# Patient Record
Sex: FEMALE | Race: WHITE | HISPANIC OR LATINO
[De-identification: names, ages, dates, MRNs, and addresses within clinical notes are randomized per-mention and may not be internally consistent; named-entity substitution may affect disease eponyms.]

---

## 2020-10-16 ENCOUNTER — RESULT REVIEW (OUTPATIENT)
Age: 45
End: 2020-10-16

## 2020-10-21 ENCOUNTER — APPOINTMENT (OUTPATIENT)
Dept: THORACIC SURGERY | Facility: HOSPITAL | Age: 45
End: 2020-10-21

## 2020-10-21 ENCOUNTER — RESULT REVIEW (OUTPATIENT)
Age: 45
End: 2020-10-21

## 2020-11-02 ENCOUNTER — NON-APPOINTMENT (OUTPATIENT)
Age: 45
End: 2020-11-02

## 2020-11-11 DIAGNOSIS — J18.9 PNEUMONIA, UNSPECIFIED ORGANISM: ICD-10-CM

## 2020-11-11 DIAGNOSIS — Z86.69 PERSONAL HISTORY OF OTHER DISEASES OF THE NERVOUS SYSTEM AND SENSE ORGANS: ICD-10-CM

## 2020-11-11 DIAGNOSIS — N17.9 ACUTE KIDNEY FAILURE, UNSPECIFIED: ICD-10-CM

## 2020-11-11 DIAGNOSIS — J90 PLEURAL EFFUSION, NOT ELSEWHERE CLASSIFIED: ICD-10-CM

## 2020-11-11 DIAGNOSIS — F14.90 COCAINE USE, UNSPECIFIED, UNCOMPLICATED: ICD-10-CM

## 2020-11-11 NOTE — HISTORY OF PRESENT ILLNESS
[FreeTextEntry1] : Mrs. Mann is a 44 year old female here today for initial evaluation of right loculated pleural effusion. She has a past medical history significant for Right Loculated Pleural Effusion. She was admitted to the hospital on ___ to with locuated right pleural effusion s.p IR 8.5Fr palced on 10/16/2020. Drained 1L in total since of clear yellow fluid. Cultures NGTD. Given Meropenem due to KOBY during hospitalization. \par \par Final pathology revealed\par 10/21/2020 \par negative for malignant cells\par reactive mesothelial cells, chronic inflammatory cells favor benign process

## 2020-11-12 ENCOUNTER — APPOINTMENT (OUTPATIENT)
Dept: THORACIC SURGERY | Facility: CLINIC | Age: 45
End: 2020-11-12
Payer: MEDICAID

## 2020-11-12 VITALS
OXYGEN SATURATION: 96 % | HEIGHT: 63 IN | HEART RATE: 62 BPM | SYSTOLIC BLOOD PRESSURE: 126 MMHG | BODY MASS INDEX: 21.79 KG/M2 | DIASTOLIC BLOOD PRESSURE: 78 MMHG | TEMPERATURE: 99.6 F | WEIGHT: 123 LBS | RESPIRATION RATE: 15 BRPM

## 2020-11-12 DIAGNOSIS — B95.61 BACTEREMIA: ICD-10-CM

## 2020-11-12 DIAGNOSIS — J90 PLEURAL EFFUSION, NOT ELSEWHERE CLASSIFIED: ICD-10-CM

## 2020-11-12 DIAGNOSIS — R78.81 BACTEREMIA: ICD-10-CM

## 2020-11-12 DIAGNOSIS — J86.9 PYOTHORAX W/OUT FISTULA: ICD-10-CM

## 2020-11-12 PROCEDURE — 99024 POSTOP FOLLOW-UP VISIT: CPT

## 2020-11-12 RX ORDER — AMLODIPINE BESYLATE 5 MG/1
5 TABLET ORAL
Refills: 0 | Status: ACTIVE | COMMUNITY

## 2020-11-16 PROBLEM — J86.9 EMPYEMA, RIGHT: Status: ACTIVE | Noted: 2020-11-12

## 2022-06-07 PROBLEM — Z00.00 ENCOUNTER FOR PREVENTIVE HEALTH EXAMINATION: Status: ACTIVE | Noted: 2022-06-07

## 2022-07-11 ENCOUNTER — APPOINTMENT (OUTPATIENT)
Dept: OBGYN | Facility: CLINIC | Age: 47
End: 2022-07-11

## 2022-07-25 ENCOUNTER — APPOINTMENT (OUTPATIENT)
Dept: OBGYN | Facility: CLINIC | Age: 47
End: 2022-07-25

## 2022-10-07 ENCOUNTER — NON-APPOINTMENT (OUTPATIENT)
Age: 47
End: 2022-10-07

## 2022-10-10 ENCOUNTER — APPOINTMENT (OUTPATIENT)
Dept: OBGYN | Facility: CLINIC | Age: 47
End: 2022-10-10

## 2022-10-10 ENCOUNTER — LABORATORY RESULT (OUTPATIENT)
Age: 47
End: 2022-10-10

## 2022-10-10 VITALS
BODY MASS INDEX: 25.76 KG/M2 | SYSTOLIC BLOOD PRESSURE: 120 MMHG | DIASTOLIC BLOOD PRESSURE: 76 MMHG | HEIGHT: 62 IN | WEIGHT: 140 LBS

## 2022-10-10 DIAGNOSIS — Z01.419 ENCOUNTER FOR GYNECOLOGICAL EXAMINATION (GENERAL) (ROUTINE) W/OUT ABNORMAL FINDINGS: ICD-10-CM

## 2022-10-10 DIAGNOSIS — Z00.00 ENCOUNTER FOR GENERAL ADULT MEDICAL EXAMINATION W/OUT ABNORMAL FINDINGS: ICD-10-CM

## 2022-10-10 LAB
BILIRUB UR QL STRIP: NORMAL
CLARITY UR: CLEAR
COLLECTION METHOD: NORMAL
GLUCOSE UR-MCNC: NORMAL
HCG UR QL: 0.2 EU/DL
HCG UR QL: NEGATIVE
HGB UR QL STRIP.AUTO: NORMAL
KETONES UR-MCNC: NORMAL
LEUKOCYTE ESTERASE UR QL STRIP: ABNORMAL
NITRITE UR QL STRIP: NORMAL
PH UR STRIP: 7
PROT UR STRIP-MCNC: NORMAL
QUALITY CONTROL: YES
SP GR UR STRIP: 1.01

## 2022-10-10 PROCEDURE — 81025 URINE PREGNANCY TEST: CPT

## 2022-10-10 PROCEDURE — 81003 URINALYSIS AUTO W/O SCOPE: CPT | Mod: QW

## 2022-10-10 PROCEDURE — 99204 OFFICE O/P NEW MOD 45 MIN: CPT | Mod: 25

## 2022-10-10 PROCEDURE — 99386 PREV VISIT NEW AGE 40-64: CPT

## 2022-10-10 NOTE — HISTORY OF PRESENT ILLNESS
[Menarche Age: ____] : age at menarche was [unfilled] [FreeTextEntry1] : Shellie is a 46-year-old coming in for anemia. \par LMP last month. reports regular menses, bleeding for 5 days, 2 heavy days, reports 6 soaked overnight pads during heavy days. No history of pelvic US. \par No history of GYN exam.\par FMP age 11\par Last PAP - never done. \par Sexually not active.\par No history of STDs.\par , 4 NSVDX4\par Mammogram - never done.\par PMH - none\par PSH - none\par Medications - ferrous sulfate, iron infusion.\par Allergies -NKDA\par No smoking or drug use, ETOH - none\par Family -none\par

## 2022-10-10 NOTE — DISCUSSION/SUMMARY
[FreeTextEntry1] : Exam as above.  Some of the history was obtained independently from the chaperone, her sister.\par Suspected subserosal fibroid on the left.\par Pap done.  Mammogram referral given.\par Discussed with the patient definition of abnormal uterine bleeding: abnormal quantity, duration, or schedule of bleeding.\par Discussed etiologies for AUB:\par 1. Structural uterine pathologies such as fibroids, polyps, adenomyosis vascular malformations, other uterine malformations, or neoplasia.\par 2. Non uterine causes such as ovulatory dysfunction, bleeding disorders, medications, cervical disorders.\par Discussed evaluation including:\par 1. Pregnancy test negative\par 2. CBC, ferritin, TIBC to evaluate for anemia\par 3. Thyroid evaluation\par 4. Pelvic US to evaluate for fibroids, adenomyosis, polyps, uterine AV malformations, with possible Saline hysterography if needed\par 5. Endometrial sampling after sonogram evaluation - EMB or hysteroscopic EMC\par 6. Possible evaluation for bleeding disorders\par \par Referrals for blood work and pelvic transvaginal US given. Patient will follow up for results and discussion on next steps\par

## 2022-10-10 NOTE — PHYSICAL EXAM
[Chaperone Present] : A chaperone was present in the examining room during all aspects of the physical examination [Appropriately responsive] : appropriately responsive [Alert] : alert [No Acute Distress] : no acute distress [Soft] : soft [Non-tender] : non-tender [Non-distended] : non-distended [Oriented x3] : oriented x3 [Examination Of The Breasts] : a normal appearance [No Masses] : no breast masses were palpable [Labia Majora] : normal [Labia Minora] : normal [Normal] : normal [Uterine Adnexae] : normal [Enlarged ___ wks] : enlarged [unfilled] ~Uweeks [FreeTextEntry1] : Rebecca hernanedz [FreeTextEntry6] : On the left pelvis there is a mass extending from the uterus, feels like a subserosal fibroid.

## 2022-10-11 LAB
APPEARANCE: CLEAR
BACTERIA: NEGATIVE
BILIRUBIN URINE: NEGATIVE
BLOOD URINE: NEGATIVE
COLOR: YELLOW
GLUCOSE QUALITATIVE U: NEGATIVE
HYALINE CASTS: 1 /LPF
KETONES URINE: NEGATIVE
LEUKOCYTE ESTERASE URINE: NEGATIVE
MICROSCOPIC-UA: NORMAL
NITRITE URINE: NEGATIVE
PH URINE: 6.5
PROTEIN URINE: NORMAL
RED BLOOD CELLS URINE: 1 /HPF
SPECIFIC GRAVITY URINE: 1.02
SQUAMOUS EPITHELIAL CELLS: 2 /HPF
UROBILINOGEN URINE: NORMAL
WHITE BLOOD CELLS URINE: 3 /HPF

## 2022-10-12 LAB — BACTERIA UR CULT: NORMAL

## 2022-10-18 ENCOUNTER — NON-APPOINTMENT (OUTPATIENT)
Age: 47
End: 2022-10-18

## 2022-10-18 LAB
CYTOLOGY CVX/VAG DOC THIN PREP: ABNORMAL
HPV HIGH+LOW RISK DNA PNL CVX: DETECTED

## 2022-10-24 ENCOUNTER — APPOINTMENT (OUTPATIENT)
Dept: OBGYN | Facility: CLINIC | Age: 47
End: 2022-10-24

## 2022-10-24 DIAGNOSIS — R87.610 ATYPICAL SQUAMOUS CELLS OF UNDETERMINED SIGNIFICANCE ON CYTOLOGIC SMEAR OF CERVIX (ASC-US): ICD-10-CM

## 2022-10-24 DIAGNOSIS — R87.810 ATYPICAL SQUAMOUS CELLS OF UNDETERMINED SIGNIFICANCE ON CYTOLOGIC SMEAR OF CERVIX (ASC-US): ICD-10-CM

## 2022-10-24 LAB
HCG UR QL: NEGATIVE
QUALITY CONTROL: YES

## 2022-10-24 PROCEDURE — 81025 URINE PREGNANCY TEST: CPT

## 2022-10-24 PROCEDURE — 57454 BX/CURETT OF CERVIX W/SCOPE: CPT

## 2022-10-24 PROCEDURE — 99212 OFFICE O/P EST SF 10 MIN: CPT | Mod: 25

## 2022-10-24 NOTE — REASON FOR VISIT
[Follow-Up] : a follow-up evaluation of [Family Member] : family member [[employer] ] : provided by [employer]   [Interpreters_IDNumber] : [id number]

## 2022-10-24 NOTE — PROCEDURE
[Colposcopy] : Colposcopy  [Time out performed] : Pre-procedure time out performed.  Patient's name, date of birth and procedure confirmed. [Consent Obtained] : Consent obtained [Risks] : risks [Benefits] : benefits [Alternatives] : alternatives [Patient] : patient [Infection] : infection [Bleeding] : bleeding [Allergic Reaction] : allergic reaction [ASCUS] : ASCUS [HPV High Risk] : HPV high risk [Colposcopy Adequate] : colposcopy adequate [SCI Fully Visualized] : SCI not fully visualized [ECC Performed] : ECC performed [No Abnormalities] : no abnormalities [Lesion] : lesion seen [Biopsy] : biopsy taken [Hemostasis Obtained] : Hemostasis obtained [Tolerated Well] : the patient tolerated the procedure well [de-identified] : 4 [de-identified] : Circumferentially increased vascularity, most prominent at 7, 10, 12 and 4. [de-identified] : 4, 7, 10, 12 [de-identified] : Uneventful colposcopy

## 2022-10-24 NOTE — DISCUSSION/SUMMARY
[FreeTextEntry1] : Reviewed the results of the Pap smear with the patient including low-grade changes with HPV positive, not subtype 16 and 18.  I reviewed that the need for a colposcopy to assess for higher grade changes.  Discussed that if the changes are low-grade, the recommendation is to follow-up in 1 year.  Discussed that if the changes are high-grade, the recommendation is to proceed with an excisional procedure.\par Uneventful colposcopy performed after patient gave consent.\par Follow-up with results.

## 2022-10-24 NOTE — PHYSICAL EXAM
[Chaperone Present] : A chaperone was present in the examining room during all aspects of the physical examination [FreeTextEntry1] : Je toner [Appropriately responsive] : appropriately responsive [Alert] : alert [No Acute Distress] : no acute distress [Soft] : soft [Non-tender] : non-tender [Non-distended] : non-distended [Oriented x3] : oriented x3

## 2022-10-24 NOTE — HISTORY OF PRESENT ILLNESS
[FreeTextEntry1] : The patient came in for colposcopy for abnormal Pap smear, ASCUS, HPV positive, negative for subtype 16/18.\par

## 2022-10-27 ENCOUNTER — NON-APPOINTMENT (OUTPATIENT)
Age: 47
End: 2022-10-27

## 2022-10-28 LAB — CORE LAB BIOPSY: NORMAL

## 2022-11-21 ENCOUNTER — APPOINTMENT (OUTPATIENT)
Dept: OBGYN | Facility: CLINIC | Age: 47
End: 2022-11-21

## 2022-11-21 ENCOUNTER — NON-APPOINTMENT (OUTPATIENT)
Age: 47
End: 2022-11-21

## 2022-11-29 ENCOUNTER — APPOINTMENT (OUTPATIENT)
Dept: OBGYN | Facility: CLINIC | Age: 47
End: 2022-11-29

## 2022-11-29 VITALS
DIASTOLIC BLOOD PRESSURE: 76 MMHG | SYSTOLIC BLOOD PRESSURE: 122 MMHG | WEIGHT: 140 LBS | BODY MASS INDEX: 25.76 KG/M2 | HEIGHT: 62 IN

## 2022-11-29 PROCEDURE — 99214 OFFICE O/P EST MOD 30 MIN: CPT

## 2022-11-29 NOTE — HISTORY OF PRESENT ILLNESS
[FreeTextEntry1] : Shellie came in to review ultrasound results and discuss management options.\par She had an ultrasound done for abnormal uterine bleeding.  The ultrasound shows a 10.4 x 7.5 x 5.8 cm uterus, endometrium 1.6 cm with trace fluid, normal bilateral ovaries.\par The patient came with her sister and declined the use of  phone.  She prefers to use her sister as her .

## 2022-11-29 NOTE — REASON FOR VISIT
[Follow-Up] : a follow-up evaluation of [Abnormal Uterine Bleeding] : abnormal uterine bleeding [Family Member] : family member [FreeTextEntry2] : US results

## 2022-11-29 NOTE — DISCUSSION/SUMMARY
[FreeTextEntry1] : I reviewed the images of the ultrasound myself.\par The patient has areas of significantly thickened irregular endometrium, specifically in the lower uterine segment on transverse image where it appears to be up to 3 cm and in the uterine body weight appears to be up to 2.1 cm.  There is also trace free fluid in the endometrial canal.\par I reviewed these results with the patient and her sister.  I discussed with them that given this findings I would recommend hysteroscopy with endometrial biopsy.  I discussed with them that this could be done both in the office or in the operating room.  Discussed that office hysteroscopy is easier to do with less risk, but if large polyps are found or significant abnormalities are seen, she may have to do a hysteroscopy D&C in the operating room.\par The patient opted for office hysteroscopy with EMB.\par She will be scheduled for the procedure.

## 2022-12-20 ENCOUNTER — NON-APPOINTMENT (OUTPATIENT)
Age: 47
End: 2022-12-20

## 2022-12-20 ENCOUNTER — APPOINTMENT (OUTPATIENT)
Dept: OBGYN | Facility: CLINIC | Age: 47
End: 2022-12-20

## 2023-01-26 ENCOUNTER — APPOINTMENT (OUTPATIENT)
Dept: OBGYN | Facility: CLINIC | Age: 48
End: 2023-01-26
Payer: MEDICAID

## 2023-01-26 VITALS
WEIGHT: 140 LBS | BODY MASS INDEX: 25.76 KG/M2 | HEIGHT: 62 IN | SYSTOLIC BLOOD PRESSURE: 150 MMHG | DIASTOLIC BLOOD PRESSURE: 80 MMHG

## 2023-01-26 DIAGNOSIS — R93.89 ABNORMAL FINDINGS ON DIAGNOSTIC IMAGING OF OTHER SPECIFIED BODY STRUCTURES: ICD-10-CM

## 2023-01-26 DIAGNOSIS — Z00.00 ENCOUNTER FOR GENERAL ADULT MEDICAL EXAMINATION W/OUT ABNORMAL FINDINGS: ICD-10-CM

## 2023-01-26 LAB
HCG UR QL: NEGATIVE
QUALITY CONTROL: YES

## 2023-01-26 PROCEDURE — 81025 URINE PREGNANCY TEST: CPT

## 2023-01-26 PROCEDURE — 99212 OFFICE O/P EST SF 10 MIN: CPT | Mod: 25

## 2023-01-26 PROCEDURE — 58558Z: CUSTOM

## 2023-01-26 NOTE — PHYSICAL EXAM
[Chaperone Present] : A chaperone was present in the examining room during all aspects of the physical examination [FreeTextEntry1] : Je toner [Appropriately responsive] : appropriately responsive [Alert] : alert [No Acute Distress] : no acute distress [Soft] : soft [Non-tender] : non-tender [Non-distended] : non-distended [Oriented x3] : oriented x3 [Labia Majora] : normal [Labia Minora] : normal [Normal] : normal

## 2023-01-26 NOTE — PROCEDURE
[Hysteroscopy] : Hysteroscopy [Abnormal uterine bleeding] : abnormal uterine bleeding [Time out performed] : Pre-procedure time out performed.  Patient's name, date of birth and procedure confirmed. [Consent Obtained] : Consent obtained [Risks] : risks [Benefits] : benefits [Alternatives] : alternatives [Patient] : patient [Infection] : infection [Bleeding] : bleeding [Allergic Reaction] : allergic reaction [rigid] : Using aseptic technique a hysteroscopy was performed using a rigid hysteroscope [Sent to Pathology] : specimen was placed in buffered formalin and sent for pathology [Antibiotics given] : antibiotics not given [Hemostasis obtained] : hemostasis obtained [Tolerated Well] : Patient tolerated the procedure well [Aftercare instructions/regstrictions given and follow-up scheduled] : Aftercare instructions/restrictions given and follow-up scheduled [de-identified] : Hysteroscopy was performed, uterus is very anteverted.  Cervix is long.\par Endometrium appeared to be of polypoid nature, but no distinct polyp was noted.  Somewhat difficult visualization due to angle of the uterus.  EMB done

## 2023-01-26 NOTE — DISCUSSION/SUMMARY
[FreeTextEntry1] : I reviewed with the patient the procedure in detail and a hysteroscopy was EMB was done, see above.  The patient tolerated the procedure well.  I discussed with her follow-up in 2 weeks to review results and discuss management options as needed.  She reports her last period was normal.\par Follow-up in 2 weeks

## 2023-01-26 NOTE — HISTORY OF PRESENT ILLNESS
[FreeTextEntry1] : Shellie came in for hysteroscopy with endometrial biopsy in the office.\par History of AUB, endometrium with areas that looked thickened and irregular.\par   phone was used with consent obtaining,  number is on the consent form.\par

## 2023-01-26 NOTE — REASON FOR VISIT
[Follow-Up] : a follow-up evaluation of [Abnormal Uterine Bleeding] : abnormal uterine bleeding [Family Member] : family member

## 2023-02-09 ENCOUNTER — APPOINTMENT (OUTPATIENT)
Dept: OBGYN | Facility: CLINIC | Age: 48
End: 2023-02-09
Payer: MEDICAID

## 2023-02-09 ENCOUNTER — NON-APPOINTMENT (OUTPATIENT)
Age: 48
End: 2023-02-09

## 2023-02-09 ENCOUNTER — APPOINTMENT (OUTPATIENT)
Dept: OBGYN | Facility: CLINIC | Age: 48
End: 2023-02-09

## 2023-02-09 VITALS — SYSTOLIC BLOOD PRESSURE: 126 MMHG | DIASTOLIC BLOOD PRESSURE: 72 MMHG

## 2023-02-09 DIAGNOSIS — Z71.2 PERSON CONSULTING FOR EXPLANATION OF EXAMINATION OR TEST FINDINGS: ICD-10-CM

## 2023-02-09 PROCEDURE — 99214 OFFICE O/P EST MOD 30 MIN: CPT

## 2023-02-09 NOTE — HISTORY OF PRESENT ILLNESS
[FreeTextEntry1] : Shellie came in to review her biopsy results and discuss management options.\par The biopsy results were benign, proliferative endometrium.\par Shellie has abnormal uterine bleeding that is complicated by anemia.  Per patient she is followed by [facility].

## 2023-02-09 NOTE — DISCUSSION/SUMMARY
[FreeTextEntry1] : I reviewed with Shellie and her sister the results of the biopsy.  Discussed that these findings are benign.  There is does not have fibroids and her uterus appears normal though slightly enlarged on ultrasound.  She did not have a distinct polyp in the uterus on hysteroscopy in the endometrial biopsy was of proliferative endometrium.  Given her reported anemia, I reviewed management options for abnormal uterine bleeding with the patient:\par 1.  Medical management including oral progesterone Depo-Provera injection or progesterone IUD.  I reviewed each option with the patient.\par 2.  Endometrial ablation.  Discussed the procedure of endometrial ablation with the patient.\par 3.  Hysterectomy.\par The patient is interested in medical management trial, and opted for Mirena IUD insertion.  I discussed with her taking Motrin 800 mg 1 hour before her appointment time for IUD insertion.  We discussed that if this management option fails, ablation and hysterectomy are always available.\par I requested that she sign on release of information consent to obtain her medical records from [facility].\par All her questions were answered.\par Follow-up for IUD insertion.

## 2023-02-09 NOTE — REASON FOR VISIT
[Follow-Up] : a follow-up evaluation of [[employer] ] : provided by [employer]   [Family Member] : family member [Interpreters_IDNumber] : [id number] [FreeTextEntry3] : The conversation was disconnected and the patient requested that her sister acts as  and not to call the  service back [TWNoteComboBox1] : Bolivian

## 2023-02-10 LAB — CORE LAB BIOPSY: NORMAL

## 2023-02-14 ENCOUNTER — APPOINTMENT (OUTPATIENT)
Dept: OBGYN | Facility: CLINIC | Age: 48
End: 2023-02-14

## 2023-02-14 ENCOUNTER — NON-APPOINTMENT (OUTPATIENT)
Age: 48
End: 2023-02-14

## 2023-03-14 ENCOUNTER — APPOINTMENT (OUTPATIENT)
Dept: OBGYN | Facility: CLINIC | Age: 48
End: 2023-03-14

## 2023-03-15 ENCOUNTER — APPOINTMENT (OUTPATIENT)
Dept: OBGYN | Facility: CLINIC | Age: 48
End: 2023-03-15
Payer: MEDICAID

## 2023-03-15 VITALS
DIASTOLIC BLOOD PRESSURE: 74 MMHG | HEIGHT: 62 IN | SYSTOLIC BLOOD PRESSURE: 124 MMHG | BODY MASS INDEX: 25.76 KG/M2 | WEIGHT: 140 LBS

## 2023-03-15 DIAGNOSIS — N93.9 ABNORMAL UTERINE AND VAGINAL BLEEDING, UNSPECIFIED: ICD-10-CM

## 2023-03-15 DIAGNOSIS — D64.9 ANEMIA, UNSPECIFIED: ICD-10-CM

## 2023-03-15 DIAGNOSIS — Z71.89 OTHER SPECIFIED COUNSELING: ICD-10-CM

## 2023-03-15 DIAGNOSIS — Z86.79 PERSONAL HISTORY OF OTHER DISEASES OF THE CIRCULATORY SYSTEM: ICD-10-CM

## 2023-03-15 LAB
HCG UR QL: NEGATIVE
QUALITY CONTROL: YES

## 2023-03-15 PROCEDURE — 99214 OFFICE O/P EST MOD 30 MIN: CPT

## 2023-03-15 RX ORDER — IBUPROFEN 800 MG/1
800 TABLET, FILM COATED ORAL ONCE
Qty: 1 | Refills: 0 | Status: ACTIVE | COMMUNITY
Start: 2023-03-15 | End: 1900-01-01

## 2023-03-15 NOTE — DISCUSSION/SUMMARY
[FreeTextEntry1] :   I reviewed with Shellie and her sister the management options.\par 1.  Medical management with combination contraception.  She is not a good candidate for that as she has HTN.\par 2.  Medical management with progesterone only options–progesterone pill, progesterone injection or IUD.\par 3.  Surgical management with endometrial ablation.\par 4.  Considering uterine artery embolization if we establish adenomyosis diagnosis.\par 5.  Hysterectomy.\par Discussed with the patient that in cases of adenomyosis endometrial ablation may not be sufficient, as it involves implantation of endometrial cells in the uterine muscle, and the ablation does not reach these cells.  Discussed that the role of progesterone is to decrease the lining of the uterus, as well as cause atrophy of the cells that are implanted in the muscle, by that decreasing the bleeding.\par Discussed that if she is interested in UAE, the recommendation would be to get an MRI to establish the diagnosis prior to proceeding with UAE.\par Discussed that definitive treatment would be a hysterectomy, discussed that this is a major surgery.\par Discussed that any medical management option that we choose can be changed if  Shellie does not respond well to treatment.\par   Shellie is currently not interested in proceeding with an MRI of the pelvis, but interested in medical management of her abnormal uterine bleeding and anemia.\par She asked about the progesterone injection.  We discussed that this is done every 3 months.  Discussed that the downside of progesterone injection is its effect on bone density, and discussed that if she opts for this, I would recommend getting a DEXA scan after 2 years of use to assess bone health.\par She inquired about the intrauterine device as well.  I reviewed with her the insertion of IUD and the function of it.  Discussed that IUD for abnormal bleeding can be used for up to 5 years.\par The patient opted for IUD insertion, requests Motrin to be sent to her pharmacy for pain management prior to the procedure.  I discussed with her prescription of a single dose 800 mg of ibuprofen to be taken 1 hour before the scheduled IUD insertion. Prescription sent to her pharmacy.\par All her questions were answered and she will schedule the procedure.

## 2023-03-15 NOTE — REASON FOR VISIT
[Follow-Up] : a follow-up evaluation of [Family Member] : family member [[employer] ] : provided by [employer]   [Time Spent: ____ minutes] : Total time spent using  services: [unfilled] minutes. The patient's primary language is not English thus required  services. [FreeTextEntry2] : discuss surgery management  [Interpreters_IDNumber] : [id number]

## 2023-03-15 NOTE — HISTORY OF PRESENT ILLNESS
[FreeTextEntry1] : Shellie came in to review her  management options again for abnormal bleeding.  She was seen in February, at that point she thought she wanted an IUD.  She then became unsure and started considering endometrial ablation.\par She has longstanding abnormal uterine bleeding, complicated by anemia.  She received IV iron infusions in the past.

## 2023-03-24 ENCOUNTER — APPOINTMENT (OUTPATIENT)
Dept: OBGYN | Facility: CLINIC | Age: 48
End: 2023-03-24
Payer: MEDICAID

## 2023-03-24 VITALS
WEIGHT: 143 LBS | DIASTOLIC BLOOD PRESSURE: 88 MMHG | SYSTOLIC BLOOD PRESSURE: 152 MMHG | BODY MASS INDEX: 26.31 KG/M2 | HEIGHT: 62 IN

## 2023-03-24 DIAGNOSIS — Z30.430 ENCOUNTER FOR INSERTION OF INTRAUTERINE CONTRACEPTIVE DEVICE: ICD-10-CM

## 2023-03-24 PROCEDURE — 99212 OFFICE O/P EST SF 10 MIN: CPT | Mod: 25

## 2023-03-24 PROCEDURE — 58300 INSERT INTRAUTERINE DEVICE: CPT

## 2023-03-24 PROCEDURE — 76857 US EXAM PELVIC LIMITED: CPT

## 2023-03-24 NOTE — HISTORY OF PRESENT ILLNESS
[FreeTextEntry1] : Shellie came in for IUD insertion.\par History of abnormal uterine bleeding.\par Benign pathology on endometrial biopsy.\par Interested in medical management of abnormal bleeding.

## 2023-03-24 NOTE — REASON FOR VISIT
[Follow-Up] : a follow-up evaluation of [Abnormal Uterine Bleeding] : abnormal uterine bleeding [[employer] ] : provided by [employer]   [Interpreters_IDNumber] : [id number]

## 2023-03-24 NOTE — DISCUSSION/SUMMARY
[FreeTextEntry1] :   GC taken\par I reviewed with the patient again the IUD.  I discussed with her that initially progesterone only contraception can cause spotting, up to 3 to 6 months.\par Discussed with her that after that she can expect either aminuria or very minimal bleeding.\par Discussed with her the risks of the procedure including, but not limited to infection, bleeding, uterine perforation with need for emergency surgery, and the patient signed on consent.\par Uneventful IUD insertion.\par Discussed follow-up in 1 month for IUD location.\par All her questions were answered.

## 2023-03-24 NOTE — PROCEDURE
[IUD Placement] : intrauterine device (IUD) placement [Time out performed] : Pre-procedure time out performed.  Patient's name, date of birth and procedure confirmed. [Consent Obtained] : Consent obtained [Abnormal Uterine Bleeding] : abnormal uterine bleeding [Risks] : risks [Benefits] : benefits [Alternatives] : alternatives [Patient] : patient [Infection] : infection [Bleeding] : bleeding [Pain] : pain [Expulsion] : expulsion [Failure] : failure [Uterine Perforation] : uterine perforation [Locate IUD] : locate IUD [Neg Pregnancy Test] : negative pregnancy test [No Premedication] : No premedication [Betadine] : Betadine [Tenaculum] : Tenaculum [Easy Passage] : Easy passage [Sounded to ___ cm] : sounded to [unfilled] ~Ucm [Post Placement Transvag. US] : post placement transvaginal ultrasound [Mirena IUD] : Mirena IUD [Tolerated Well] : Patient tolerated the procedure well [No Complications] : No complications [None] : None [FreeTextEntry4] : IUD appropriately located in the endometrial cavity [de-identified] : GC taken [de-identified] : [id number] [de-identified] : 9/2024 [de-identified] : 3/23/2028

## 2023-03-24 NOTE — PHYSICAL EXAM
[Chaperone Present] : A chaperone was present in the examining room during all aspects of the physical examination [FreeTextEntry1] : Miesha Jorge [Appropriately responsive] : appropriately responsive [Alert] : alert [No Acute Distress] : no acute distress [Soft] : soft [Non-tender] : non-tender [Non-distended] : non-distended [Oriented x3] : oriented x3 [Labia Majora] : normal [Labia Minora] : normal [Normal] : normal

## 2023-03-27 LAB
C TRACH RRNA SPEC QL NAA+PROBE: NOT DETECTED
N GONORRHOEA RRNA SPEC QL NAA+PROBE: NOT DETECTED
SOURCE AMPLIFICATION: NORMAL

## 2023-04-27 ENCOUNTER — APPOINTMENT (OUTPATIENT)
Dept: OBGYN | Facility: CLINIC | Age: 48
End: 2023-04-27
Payer: MEDICAID

## 2023-04-27 VITALS
WEIGHT: 143 LBS | DIASTOLIC BLOOD PRESSURE: 90 MMHG | BODY MASS INDEX: 26.31 KG/M2 | HEIGHT: 62 IN | SYSTOLIC BLOOD PRESSURE: 138 MMHG

## 2023-04-27 DIAGNOSIS — Z30.431 ENCOUNTER FOR ROUTINE CHECKING OF INTRAUTERINE CONTRACEPTIVE DEVICE: ICD-10-CM

## 2023-04-27 LAB
HCG UR QL: NEGATIVE
QUALITY CONTROL: YES

## 2023-04-27 PROCEDURE — 76856 US EXAM PELVIC COMPLETE: CPT

## 2023-04-27 PROCEDURE — 99213 OFFICE O/P EST LOW 20 MIN: CPT | Mod: 25

## 2023-04-27 NOTE — HISTORY OF PRESENT ILLNESS
[FreeTextEntry1] : Shellie came in for ultrasound follow-up after IUD insertion last month.\par She reports feeling well.\par She has no pain.\par She reports no bleeding since IUD insertion.

## 2023-04-27 NOTE — PHYSICAL EXAM
[Chaperone Present] : A chaperone was present in the examining room during all aspects of the physical examination [FreeTextEntry1] :   [place] [Appropriately responsive] : appropriately responsive [Alert] : alert [No Acute Distress] : no acute distress [Soft] : soft [Non-tender] : non-tender [Non-distended] : non-distended [Oriented x3] : oriented x3 [Labia Majora] : normal [Labia Minora] : normal [Normal] :  normal

## 2023-04-27 NOTE — PROCEDURE
[Locate IUD] : locate IUD [Transvaginal Ultrasound] : transvaginal ultrasound [FreeTextEntry4] : IUD appropriately located in the endometrial cavity.  Small amount of fluid within the endometrial cavity.\par Left ovary  normal, noted 3 cm simple cyst.\par Right ovary normal with 2 follicles

## 2023-04-27 NOTE — DISCUSSION/SUMMARY
[FreeTextEntry1] : The patient is overall doing excellent with IUD.\par IUD appropriately located in the endometrial cavity.\par I reviewed the ultrasound findings with the patient and recommended follow-up ultrasound for the cyst in 3 months in the office

## 2023-04-27 NOTE — REASON FOR VISIT
[Follow-Up] : a follow-up evaluation of [[employer] ] : provided by [employer]   [FreeTextEntry2] : IUD placement, US  [Interpreters_IDNumber] : [id number] [TWNoteComboBox1] : Grenadian

## 2023-07-27 ENCOUNTER — APPOINTMENT (OUTPATIENT)
Dept: OBGYN | Facility: CLINIC | Age: 48
End: 2023-07-27
Payer: SELF-PAY

## 2023-07-27 VITALS
WEIGHT: 143 LBS | SYSTOLIC BLOOD PRESSURE: 110 MMHG | BODY MASS INDEX: 26.31 KG/M2 | DIASTOLIC BLOOD PRESSURE: 64 MMHG | HEIGHT: 62 IN

## 2023-07-27 DIAGNOSIS — Z09 ENCOUNTER FOR FOLLOW-UP EXAMINATION AFTER COMPLETED TREATMENT FOR CONDITIONS OTHER THAN MALIGNANT NEOPLASM: ICD-10-CM

## 2023-07-27 PROCEDURE — 76830 TRANSVAGINAL US NON-OB: CPT

## 2023-07-27 PROCEDURE — 99213 OFFICE O/P EST LOW 20 MIN: CPT | Mod: 25

## 2023-07-27 NOTE — DISCUSSION/SUMMARY
[FreeTextEntry1] : Ultrasound did not visualize the left  ovary but in the adnexal region no mass or cyst was noted.\par On the right I noted a 1.8 cm follicle.\par No other ultrasound findings.\par   The patient noted that she had some left sided discomfort but when she was asked to point to the area of discomfort she points to an area at the level right below the umbilicus.\par I discussed with her that in that region, pain is usually not due to GYN causes but more due to GI issues.\par Discussed my recommendation to follow-up with GI.\par Follow-up as needed/for annual.

## 2023-07-27 NOTE — PROCEDURE
[Suspected Ovarian Cyst] : suspected ovarian cyst [Transvaginal Ultrasound] : transvaginal ultrasound [FreeTextEntry4] : Uterus with appropriately positioned IUD noted.\par Left ovary was not visualized, no adnexal mass noted.\par Right ovary with small 1.8 cm follicle.\par

## 2023-07-27 NOTE — PHYSICAL EXAM
[Chaperone Present] : A chaperone was present in the examining room during all aspects of the physical examination [FreeTextEntry1] : Shellie Mooney [Appropriately responsive] : appropriately responsive [Alert] : alert [No Acute Distress] : no acute distress [Soft] : soft [Non-tender] : non-tender [Non-distended] : non-distended [Oriented x3] : oriented x3 [Normal] : normal external genitalia

## 2023-07-27 NOTE — REASON FOR VISIT
[Follow-Up] : a follow-up evaluation of [[employer] ] : provided by [employer]   [FreeTextEntry2] : ultrasound  [Interpreters_IDNumber] : [id number] [Interpreters_FullName] : Reena [TWNoteComboBox1] : Sudanese

## 2023-07-27 NOTE — HISTORY OF PRESENT ILLNESS
[FreeTextEntry1] : Shellie Is coming in for follow-up on ovarian cyst.\par 3 months ago she had a sonogram after IUD placement, IUD was appropriately positioned in the endometrial cavity but there was a finding of a 3 cm left ovarian  simple cyst.\par

## 2023-11-27 PROBLEM — Z00.00 ENCOUNTER FOR PREVENTIVE HEALTH EXAMINATION: Status: ACTIVE | Noted: 2023-11-27
